# Patient Record
Sex: FEMALE | Race: BLACK OR AFRICAN AMERICAN | Employment: FULL TIME | URBAN - METROPOLITAN AREA
[De-identification: names, ages, dates, MRNs, and addresses within clinical notes are randomized per-mention and may not be internally consistent; named-entity substitution may affect disease eponyms.]

---

## 2017-03-02 ENCOUNTER — TELEPHONE (OUTPATIENT)
Dept: FAMILY MEDICINE | Facility: CLINIC | Age: 22
End: 2017-03-02

## 2017-03-03 NOTE — TELEPHONE ENCOUNTER
Spoke to patient and gave her Dia's message. Patient  States she [place] and has records   that immunizations have been done. She states that she will   Drop this off today. Waiting for records.  Savanna Ortiz MA/  For Teams Malachi

## 2017-03-03 NOTE — TELEPHONE ENCOUNTER
Reviewed the chart. I appears that we have no records of patient's immunizations, she needs MMR and Hep B.  Patient needs to get records form her pediatric clinic or come in for office visit to have titers done. The form that patient supplied requires records of the above immunizations    Dia Schwartz PAC

## 2017-03-03 NOTE — TELEPHONE ENCOUNTER
Received forms. Reviewed chart and patient has not seen Dia  And saw Ani over 1 year ago. I Lynced Heather and pointed this out.  She wants me to see if Dia will do the paperwork for Ani.  Reviewed chart again and her immunization report on shows a Tdap  Done on 2/2/16. I checked [facility] and the same thing was documented.  Patient has not had any of the rest of the requirements, at least from  This clinic, completed. I will place form in Dia's basket for review. Patient  May need to come in to have missing items completed. Rao advise.  Savanna Ortiz MA/  For Teams Malachi

## 2017-03-03 NOTE — TELEPHONE ENCOUNTER
Received completed and signed form. Copy to TC and abstracting.  Bringing original form and updated immunization report to the front   desk by 5:00 pm today. Called and spoke to patient and explained   Form .  Savanna Ortiz MA/  For Teams Malachi

## 2017-03-03 NOTE — TELEPHONE ENCOUNTER
Form will be in the dummy for Savanna to forward to Dia on the 2nd Floor.  Ray Fajardo,  For Teams Comfort and Heart

## 2017-03-03 NOTE — TELEPHONE ENCOUNTER
Pt dropped off copy of immunizations.. Which were placed on the dumbwaiter.     Idalia Hsieh, Patient Rep  [facility]

## 2017-03-03 NOTE — TELEPHONE ENCOUNTER
Received immunizations from [place].  I entered them in as historical. Sent paper  Copy to provider, abstracting and TC and routed  Message to the provider to review.  Savanna Ortiz MA/  For Teams Spirit and Tatiana

## 2017-03-10 ENCOUNTER — OFFICE VISIT (OUTPATIENT)
Dept: FAMILY MEDICINE | Facility: CLINIC | Age: 22
End: 2017-03-10
Payer: COMMERCIAL

## 2017-03-10 VITALS
OXYGEN SATURATION: 98 % | HEIGHT: 63 IN | HEART RATE: 64 BPM | DIASTOLIC BLOOD PRESSURE: 72 MMHG | BODY MASS INDEX: 26.26 KG/M2 | WEIGHT: 148.2 LBS | SYSTOLIC BLOOD PRESSURE: 103 MMHG | TEMPERATURE: 98 F

## 2017-03-10 DIAGNOSIS — G44.219 EPISODIC TENSION-TYPE HEADACHE, NOT INTRACTABLE: Primary | ICD-10-CM

## 2017-03-10 DIAGNOSIS — E66.3 OVERWEIGHT (BMI 25.0-29.9): ICD-10-CM

## 2017-03-10 PROCEDURE — 99213 OFFICE O/P EST LOW 20 MIN: CPT | Performed by: NURSE PRACTITIONER

## 2017-03-10 NOTE — NURSING NOTE
"Chief Complaint   Patient presents with     Headache     Panel Management     Pap     Patient Request for Note/Letter       Initial /72 (BP Location: Left arm, Patient Position: Chair, Cuff Size: Adult Regular)  Pulse 64  Temp 98  F (36.7  C) (Oral)  Ht 5' 2.99\" (1.6 m)  Wt 148 lb 3.2 oz (67.2 kg)  LMP 03/10/2017  SpO2 98%  BMI 26.26 kg/m2 Estimated body mass index is 26.26 kg/(m^2) as calculated from the following:    Height as of this encounter: 5' 2.99\" (1.6 m).    Weight as of this encounter: 148 lb 3.2 oz (67.2 kg).  Medication Reconciliation: complete     Panel Management: Pt will call and schedule physical/pap.  Swati Kruse MA      "

## 2017-03-10 NOTE — PROGRESS NOTES
SUBJECTIVE:                                                    Naseem Pichardo is a 22 year old female who presents to clinic today for the following health issues:      Headache     Onset: 1 week     Description:   Location: unilateral but varies as to which side   Character: sharp, squeezing pain  Frequency:  Every other day   Duration:  The entire day     Intensity: moderate    Progression of Symptoms:  same    Accompanying Signs & Symptoms:  Stiff neck: YES  Neck or upper back pain: YES- upper back   Fever: no  Sinus pressure: no  Nausea or vomiting: no  Dizziness: YES  Numbness: no  Weakness: no  Visual changes: YES- blurred vision    History:   Head trauma: no  Family history of migraines: no  Previous tests for headaches: no  Neurologist evaluations: no  Able to do daily activities: YES- sometimes   Wake with a headaches: YES  Do headaches wake you up: no  Daily pain medication use: no  Work/school stressors/changes: YES- school     Precipitating factors:   Does light make it worse: no  Does sound make it worse: YES    Alleviating factors:  Does sleep help: no         Therapies Tried and outcome: Ibuprofen (Advil, Motrin)- minor relief.  Taking 3-4 days/week, headaches not related to meals, menstrual cycle, activity.  She eats regular meals, drinks plenty of fluids, sleep 4-5 hours/noc.  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      Problem list and histories reviewed & adjusted, as indicated.  Additional history: as documented    Recent Labs   Lab Test  02/04/16   0951   LDL  103*   HDL  53   TRIG  95      BP Readings from Last 3 Encounters:   03/10/17 103/72   02/02/16 100/66    Wt Readings from Last 3 Encounters:   03/10/17 148 lb 3.2 oz (67.2 kg)   02/02/16 146 lb 1 oz (66.3 kg)                  Labs reviewed in EPIC    Reviewed and updated as needed this visit by clinical staff  Tobacco  Allergies  Meds  Med Hx  Surg Hx  Fam Hx  Soc Hx      Reviewed and updated as needed this visit by  "Provider         ROS:  Constitutional, HEENT, cardiovascular, pulmonary, gi and gu systems are negative, except as otherwise noted.    OBJECTIVE:                                                    /72 (BP Location: Left arm, Patient Position: Chair, Cuff Size: Adult Regular)  Pulse 64  Temp 98  F (36.7  C) (Oral)  Ht 5' 2.99\" (1.6 m)  Wt 148 lb 3.2 oz (67.2 kg)  LMP 03/10/2017  SpO2 98%  BMI 26.26 kg/m2  Body mass index is 26.26 kg/(m^2).  GENERAL: healthy, alert and no distress  EYES: Eyes grossly normal to inspection, PERRL and conjunctivae and sclerae normal  HENT: ear canals and TM's normal, nose and mouth without ulcers or lesions  NECK: no adenopathy, no asymmetry, masses, or scars and thyroid normal to palpation  RESP: lungs clear to auscultation - no rales, rhonchi or wheezes  CV: regular rate and rhythm, normal S1 S2, no S3 or S4, no murmur, click or rub, no peripheral edema and peripheral pulses strong  ABDOMEN: soft, nontender, no hepatosplenomegaly, no masses and bowel sounds normal  MS: no gross musculoskeletal defects noted, no edema  SKIN: no suspicious lesions or rashes  NEURO: Normal strength and tone, sensory exam grossly normal, mentation intact, oriented times 3, speech normal, cranial nerves 2-12 intact, DTR's normal and symmetric UE/LE and gait normal including heel/toe/tandem walking  BACK: no CVA tenderness, no paralumbar tenderness  PSYCH: mentation appears normal, affect normal/bright    Diagnostic Test Results:  none      ASSESSMENT/PLAN:                                                          BMI:   Estimated body mass index is 26.26 kg/(m^2) as calculated from the following:    Height as of this encounter: 5' 2.99\" (1.6 m).    Weight as of this encounter: 148 lb 3.2 oz (67.2 kg).   Weight management plan: Discussed healthy diet and exercise guidelines and patient will follow up in 12 months in clinic to re-evaluate.      1. Episodic tension-type headache, not intractable    OK to " use Tylenol or Ibuprofen for episodic headaches but do not use for more than 3 days/week to avoid rebound headache.  Patient was given educational materials on tension headaches.  Patient advised to do ROM exercises 2-3 times per day and apply heat to the neck as needed (patient warned not to sleep with heating pad on), and discussed stress management techniques.  Advised headache log.Follow-up if headache persists despite these treatments, or if symptoms change.       2.  Overweight, BMI 25.0-29.9  Benefits of weight loss reviewed in detail, encouraged her to cut back on the carbohydrates in the diet, consume more fruits and vegetables, drink plenty of water, avoid fruit juices, sodas, get 150 min moderate exercise/week.  Recheck weight in 6 months.          See Patient Instructions    VY Mensah [facility]

## 2017-03-10 NOTE — PATIENT INSTRUCTIONS
Based on your medical history and these are the current health maintenance or preventive care services that you are due for (some may have been done at this visit)  Health Maintenance Due   Topic Date Due     HPV IMMUNIZATION (1 of 3 - Female 3 Dose Series) 01/05/2006     PAP SCREENING Q3 YR (SYSTEM ASSIGNED)  01/05/2016     EYE EXAM Q1 YEAR( NO INBASKET)  02/10/2017         At [facility], we strive to deliver an exceptional experience to you, every time we see you.    If you receive a survey in the mail, please send us back your thoughts. We really do value your feedback.    Your care team's suggested websites for health information:  Www.PipelineDB.org : Up to date and easily searchable information on multiple topics.  Www.medlineplus.gov : medication info, interactive tutorials, watch real surgeries online  Www.familydoctor.org : good info from the Academy of Family Physicians  Www.cdc.gov : public health info, travel advisories, epidemics (H1N1)  Www.aap.org : children's health info, normal development, vaccinations  Www.health.[facility].[place].us : [place] dept of health, public health issues in [place], N1N1    How to contact your care team:   Team Tatiana/Spirit [phone]         Pharmacy [phone]    Dr. Blanca, Dia Schwartz PA-C, Dr. Torres, Camille Stahl APRN CNP, Fanny Cortez PA-C, Dr. Singer, and VY Blank CNP    Team RNs: Drea & Lisa      Clinic hours  M-Th 7 am-7 pm   Fri 7 am-5 pm.   Urgent care M-F 11 am-9 pm,   Sat/Sun 9 am-5 pm.  Pharmacy M-Th 8 am-8 pm Fri 8 am-6 pm  Sat/Sun 9 am-5 pm.     All password changes, disabled accounts, or ID changes in "[employer]t/MyHealth will be done by our Access Services Department.    If you need help with your account or password, call: [phone]. Clinic staff no longer has the ability to change passwords.     Tension Headaches  Tension headaches cause a dull, steady pain on both sides of the head and in the neck and the back  of the head. The eyes may also feel tired. Tension headaches can be triggered by muscle tension and clenching, lack of sleep, poor posture, eyestrain, stress,depression, anxiety, arthritis of the neck, and other factors.    To help prevent tension headaches  Take the following steps:    Make sure your work area is properly set up to help you avoid neck strain and eyestrain.    Make sure that your eyeglass prescription is current and is appropriate for the work you do.    Learn techniques for relaxing and reducing emotional stress. These include deep breathing, progressive relaxation, yoga, meditation, and biofeedback.    Maintain a regular exercise regimen under the guidance of a doctor. This can help keep your neck and back flexible, strong, and relaxed.  To relieve the pain  Take the following steps:    Use moist heat to relax the muscles. Soak in a hot bath or wrap a warm, moist towel around your neck.    Brush your scalp lightly with a soft hairbrush.    Give yourself a massage. Knead the muscles running from your shoulders up the back of your skull.    Use an ice pack. Apply this directly to the place where you feel pain.    Rest. Sleeping often helps relieve headache pain.    Drink plenty of fluids. Dehydration is another trigger for headaches.    Use pain medicine sparingly for moderate to severe pain.     9236-5020 The [employer]. [address] [place] [address], [place] [address]. All rights reserved. This information is not intended as a substitute for professional medical care. Always follow your healthcare professional's instructions.

## 2017-03-10 NOTE — MR AVS SNAPSHOT
After Visit Summary   3/10/2017    Naseem Pichardo    MRN: [id number]           Patient Information     Date Of Birth          1995        Visit Information        Provider Department      3/10/2017 8:00 AM Virginia Mosley APRN CNP [facility]        Today's Diagnoses     Episodic tension-type headache, not intractable    -  1    Overweight (BMI 25.0-29.9)          Care Instructions    Based on your medical history and these are the current health maintenance or preventive care services that you are due for (some may have been done at this visit)  Health Maintenance Due   Topic Date Due     HPV IMMUNIZATION (1 of 3 - Female 3 Dose Series) 01/05/2006     PAP SCREENING Q3 YR (SYSTEM ASSIGNED)  01/05/2016     EYE EXAM Q1 YEAR( NO INBASKET)  02/10/2017         At [facility], we strive to deliver an exceptional experience to you, every time we see you.    If you receive a survey in the mail, please send us back your thoughts. We really do value your feedback.    Your care team's suggested websites for health information:  Www.[employer] : Up to date and easily searchable information on multiple topics.  Www.medlineplus.gov : medication info, interactive tutorials, watch real surgeries online  Www.familydoctor.org : good info from the Academy of Family Physicians  Www.cdc.gov : public health info, travel advisories, epidemics (H1N1)  Www.aap.org : children's health info, normal development, vaccinations  Www.health.[facility].us : [place] dept of health, public health issues in [place], N1N1    How to contact your care team:   Team Tatiana/Spirit [phone]         Pharmacy [phone]    Dr. Blanca, Dia Schwartz PA-C, Dr. Torres, Camille MCCLELLAN CNP, Fanny Cortez PA-C, Dr. Singer, and VY Blank CNP    Team RNs: Drea & Lisa      Clinic hours  M-Th 7 am-7 pm   Fri 7 am-5 pm.   Urgent care M-F 11 am-9 pm,   Sat/Sun 9 am-5 pm.  Pharmacy M-Th 8  am-8 pm Fri 8 am-6 pm  Sat/Sun 9 am-5 pm.     All password changes, disabled accounts, or ID changes in [employer]/MyHealth will be done by our Access Services Department.    If you need help with your account or password, call: [phone]. Clinic staff no longer has the ability to change passwords.     Tension Headaches  Tension headaches cause a dull, steady pain on both sides of the head and in the neck and the back of the head. The eyes may also feel tired. Tension headaches can be triggered by muscle tension and clenching, lack of sleep, poor posture, eyestrain, stress,depression, anxiety, arthritis of the neck, and other factors.    To help prevent tension headaches  Take the following steps:    Make sure your work area is properly set up to help you avoid neck strain and eyestrain.    Make sure that your eyeglass prescription is current and is appropriate for the work you do.    Learn techniques for relaxing and reducing emotional stress. These include deep breathing, progressive relaxation, yoga, meditation, and biofeedback.    Maintain a regular exercise regimen under the guidance of a doctor. This can help keep your neck and back flexible, strong, and relaxed.  To relieve the pain  Take the following steps:    Use moist heat to relax the muscles. Soak in a hot bath or wrap a warm, moist towel around your neck.    Brush your scalp lightly with a soft hairbrush.    Give yourself a massage. Knead the muscles running from your shoulders up the back of your skull.    Use an ice pack. Apply this directly to the place where you feel pain.    Rest. Sleeping often helps relieve headache pain.    Drink plenty of fluids. Dehydration is another trigger for headaches.    Use pain medicine sparingly for moderate to severe pain.     5498-1238 The [employer]. [address] [place] [address], [place] [address]. All rights reserved. This information is not intended as a substitute for professional medical care. Always  "follow your healthcare professional's instructions.              Follow-ups after your visit        Follow-up notes from your care team     Return in about 4 weeks (around 2017), or if symptoms worsen or fail to improve, for Physical Exam, follow up.      Who to contact     If you have questions or need follow up information about today's clinic visit or your schedule please contact [facility] directly at [phone].  Normal or non-critical lab and imaging results will be communicated to you by [employer], letter or phone within 4 business days after the clinic has received the results. If you do not hear from us within 7 days, please contact the clinic through [employer] or phone. If you have a critical or abnormal lab result, we will notify you by phone as soon as possible.  Submit refill requests through [employer] or call your pharmacy and they will forward the refill request to us. Please allow 3 business days for your refill to be completed.          Additional Information About Your Visit        [employer] Information     [employer] lets you send messages to your doctor, view your test results, renew your prescriptions, schedule appointments and more. To sign up, go to [email or web address] . Click on \"Log in\" on the left side of the screen, which will take you to the Welcome page. Then click on \"Sign up Now\" on the right side of the page.     You will be asked to enter the access code listed below, as well as some personal information. Please follow the directions to create your username and password.     Your access code is: [email or web address]  Expires: 2017  8:54 AM     Your access code will  in 90 days. If you need help or a new code, please call your [place] clinic or [phone].        Care EveryWhere ID     This is your Care EveryWhere ID. This could be used by other organizations to access your [place] [facility] medical records  [id number]        Your Vitals Were     Pulse Temperature " "Height Last Period Pulse Oximetry BMI (Body Mass Index)    64 98  F (36.7  C) (Oral) 5' 2.99\" (1.6 m) 03/10/2017 98% 26.26 kg/m2       Blood Pressure from Last 3 Encounters:   03/10/17 103/72   02/02/16 100/66    Weight from Last 3 Encounters:   03/10/17 148 lb 3.2 oz (67.2 kg)   02/02/16 146 lb 1 oz (66.3 kg)              Today, you had the following     No orders found for display       Primary Care Provider Office Phone #    [facility] [phone]       No address on file        Thank you!     Thank you for choosing [facility]  for your care. Our goal is always to provide you with excellent care. Hearing back from our patients is one way we can continue to improve our services. Please take a few minutes to complete the written survey that you may receive in the mail after your visit with us. Thank you!             Your Updated Medication List - Protect others around you: Learn how to safely use, store and throw away your medicines at www.disposemymeds.org.      Notice  As of 3/10/2017  8:54 AM    You have not been prescribed any medications.      "

## 2017-03-10 NOTE — LETTER
[address] [address]  [phone]    March 10, 2017        Lenssa Kylee  [address]   [facility] [address]          To whom it may concern:    This patient missed school 3/10/2017 due to a clinic visit.  She can return to school on 3/13/17.    Please contact me for questions or concerns.        Sincerely,        Virginia MCCLELLAN, CNP

## 2019-08-07 ENCOUNTER — NURSE TRIAGE (OUTPATIENT)
Dept: NURSING | Facility: CLINIC | Age: 24
End: 2019-08-07

## 2019-08-07 ENCOUNTER — TELEPHONE (OUTPATIENT)
Dept: FAMILY MEDICINE | Facility: CLINIC | Age: 24
End: 2019-08-07

## 2019-08-07 ENCOUNTER — OFFICE VISIT (OUTPATIENT)
Dept: FAMILY MEDICINE | Facility: CLINIC | Age: 24
End: 2019-08-07
Payer: COMMERCIAL

## 2019-08-07 VITALS
WEIGHT: 164.13 LBS | HEART RATE: 65 BPM | TEMPERATURE: 98.9 F | SYSTOLIC BLOOD PRESSURE: 112 MMHG | OXYGEN SATURATION: 99 % | DIASTOLIC BLOOD PRESSURE: 71 MMHG | BODY MASS INDEX: 29.08 KG/M2 | HEIGHT: 63 IN

## 2019-08-07 DIAGNOSIS — B96.89 BV (BACTERIAL VAGINOSIS): ICD-10-CM

## 2019-08-07 DIAGNOSIS — H61.21 IMPACTED CERUMEN OF RIGHT EAR: ICD-10-CM

## 2019-08-07 DIAGNOSIS — Z12.4 SCREENING FOR MALIGNANT NEOPLASM OF CERVIX: ICD-10-CM

## 2019-08-07 DIAGNOSIS — N76.0 BV (BACTERIAL VAGINOSIS): ICD-10-CM

## 2019-08-07 DIAGNOSIS — E66.3 OVERWEIGHT (BMI 25.0-29.9): ICD-10-CM

## 2019-08-07 DIAGNOSIS — Z11.3 SCREEN FOR STD (SEXUALLY TRANSMITTED DISEASE): ICD-10-CM

## 2019-08-07 DIAGNOSIS — F43.21 ADJUSTMENT DISORDER WITH DEPRESSED MOOD: ICD-10-CM

## 2019-08-07 DIAGNOSIS — Z11.4 ENCOUNTER FOR SCREENING FOR HIV: ICD-10-CM

## 2019-08-07 DIAGNOSIS — Z00.00 ROUTINE GENERAL MEDICAL EXAMINATION AT A HEALTH CARE FACILITY: Primary | ICD-10-CM

## 2019-08-07 LAB
SPECIMEN SOURCE: ABNORMAL
WET PREP SPEC: ABNORMAL

## 2019-08-07 PROCEDURE — 87591 N.GONORRHOEAE DNA AMP PROB: CPT | Performed by: NURSE PRACTITIONER

## 2019-08-07 PROCEDURE — 87491 CHLMYD TRACH DNA AMP PROBE: CPT | Performed by: NURSE PRACTITIONER

## 2019-08-07 PROCEDURE — G0145 SCR C/V CYTO,THINLAYER,RESCR: HCPCS | Performed by: NURSE PRACTITIONER

## 2019-08-07 PROCEDURE — 99395 PREV VISIT EST AGE 18-39: CPT | Performed by: NURSE PRACTITIONER

## 2019-08-07 PROCEDURE — 99213 OFFICE O/P EST LOW 20 MIN: CPT | Mod: 25 | Performed by: NURSE PRACTITIONER

## 2019-08-07 PROCEDURE — 87210 SMEAR WET MOUNT SALINE/INK: CPT | Performed by: NURSE PRACTITIONER

## 2019-08-07 ASSESSMENT — ANXIETY QUESTIONNAIRES
2. NOT BEING ABLE TO STOP OR CONTROL WORRYING: MORE THAN HALF THE DAYS
1. FEELING NERVOUS, ANXIOUS, OR ON EDGE: NOT AT ALL
7. FEELING AFRAID AS IF SOMETHING AWFUL MIGHT HAPPEN: NEARLY EVERY DAY
6. BECOMING EASILY ANNOYED OR IRRITABLE: MORE THAN HALF THE DAYS
3. WORRYING TOO MUCH ABOUT DIFFERENT THINGS: MORE THAN HALF THE DAYS
5. BEING SO RESTLESS THAT IT IS HARD TO SIT STILL: SEVERAL DAYS
GAD7 TOTAL SCORE: 11
IF YOU CHECKED OFF ANY PROBLEMS ON THIS QUESTIONNAIRE, HOW DIFFICULT HAVE THESE PROBLEMS MADE IT FOR YOU TO DO YOUR WORK, TAKE CARE OF THINGS AT HOME, OR GET ALONG WITH OTHER PEOPLE: NOT DIFFICULT AT ALL

## 2019-08-07 ASSESSMENT — PAIN SCALES - GENERAL: PAINLEVEL: NO PAIN (0)

## 2019-08-07 ASSESSMENT — PATIENT HEALTH QUESTIONNAIRE - PHQ9
5. POOR APPETITE OR OVEREATING: SEVERAL DAYS
SUM OF ALL RESPONSES TO PHQ QUESTIONS 1-9: 21

## 2019-08-07 ASSESSMENT — MIFFLIN-ST. JEOR: SCORE: 1455.66

## 2019-08-07 NOTE — PROGRESS NOTES
hong     SUBJECTIVE:   CC: Naseem Pichardo is an 24 year old woman who presents for preventive health visit.     Healthy Habits:    Do you get at least three servings of calcium containing foods daily (dairy, green leafy vegetables, etc.)? yes    Amount of exercise or daily activities, outside of work: none  day(s) per week    Problems taking medications regularly not applicable    Medication side effects: No    Have you had an eye exam in the past two years? no    Do you see a dentist twice per year? yes    Do you have sleep apnea, excessive snoring or daytime drowsiness?yes daytime drowsiness       Patient also states her Father  19 in his late 50's-early 5-0's,cause unknown.  patients mood has worsened since- she is anxious and depressed, PHQ-9=21, GABY-7=11, passive thoughts of suicide but no plan or intent.  She is living with her Aunt currently. She states she's had issues with depression off an on for several years and thought about seeing a counselor but never got around to it. She has never been on medication for her mood issue.    Today's PHQ-2 Score:   PHQ-2 (  [employer]) 2016   Q1: Little interest or pleasure in doing things 0   Q2: Feeling down, depressed or hopeless 0   PHQ-2 Score 0       Abuse: Current or Past(Physical, Sexual or Emotional)- No  Do you feel safe in your environment? Yes    Social History     Tobacco Use     Smoking status: Never Smoker     Smokeless tobacco: Never Used   Substance Use Topics     Alcohol use: No     Alcohol/week: 0.0 oz     If you drink alcohol do you typically have >3 drinks per day or >7 drinks per week? No                     Reviewed orders with patient.  Reviewed health maintenance and updated orders accordingly - Yes  Labs reviewed in EPIC  BP Readings from Last 3 Encounters:   19 112/71   03/10/17 103/72   16 100/66    Wt Readings from Last 3 Encounters:   19 74.4 kg (164 lb 2 oz)   03/10/17 67.2 kg (148 lb 3.2 oz)   16 66.3 kg  (146 lb 1 oz)                  There is no problem list on file for this patient.    Past Surgical History:   Procedure Laterality Date     NO HISTORY OF SURGERY         Social History     Tobacco Use     Smoking status: Never Smoker     Smokeless tobacco: Never Used   Substance Use Topics     Alcohol use: No     Alcohol/week: 0.0 oz     Family History   Problem Relation Age of Onset     No Known Problems Mother      Hypertension Father      Diabetes No family hx of      Coronary Artery Disease No family hx of      Hyperlipidemia No family hx of      Cerebrovascular Disease No family hx of      Breast Cancer No family hx of      Colon Cancer No family hx of      Cancer No family hx of      Thyroid Disease No family hx of      Macular Degeneration No family hx of      Glaucoma No family hx of            Mammogram not appropriate for this patient based on age.    Pertinent mammograms are reviewed under the imaging tab.  History of abnormal Pap smear: NO - age 21-29 PAP every 3 years recommended     Reviewed and updated as needed this visit by clinical staff  Tobacco  Allergies  Soc Hx        Reviewed and updated as needed this visit by Provider        Past Medical History:   Diagnosis Date     NO ACTIVE PROBLEMS       Past Surgical History:   Procedure Laterality Date     NO HISTORY OF SURGERY         ROS:  CONSTITUTIONAL: NEGATIVE for fever, chills, change in weight  INTEGUMENTARU/SKIN: NEGATIVE for worrisome rashes, moles or lesions  EYES: NEGATIVE for vision changes or irritation  ENT: NEGATIVE for ear, mouth and throat problems  RESP: NEGATIVE for significant cough or SOB  BREAST: NEGATIVE for masses, tenderness or discharge  CV: NEGATIVE for chest pain, palpitations or peripheral edema  GI: NEGATIVE for nausea, abdominal pain, heartburn, or change in bowel habits  : NEGATIVE for unusual urinary or vaginal symptoms. Periods are regular.  MUSCULOSKELETAL: NEGATIVE for significant arthralgias or myalgia  NEURO:  "NEGATIVE for weakness, dizziness or paresthesias  ENDOCRINE: NEGATIVE for temperature intolerance, skin/hair changes  PSYCHIATRIC: POSITIVE for anxiety, depressed moodappetite disturbance -decreased, concentration difficulty, depressed mood, fatigue and hypersomnia and NEGATIVE foralcohol abuse, delusions, feelings of worthlessness/guilt and hopelessness    OBJECTIVE:   /71   Pulse 65   Temp 98.9  F (37.2  C) (Oral)   Ht 1.588 m (5' 2.5\")   Wt 74.4 kg (164 lb 2 oz)   SpO2 99%   BMI 29.54 kg/m    EXAM:  GENERAL: healthy, alert and no distress  EYES: Eyes grossly normal to inspection, PERRL and conjunctivae and sclerae normal  HENT: ear canals and TM's normal, nose and mouth without ulcers or lesions  NECK: no adenopathy, no asymmetry, masses, or scars and thyroid normal to palpation  RESP: lungs clear to auscultation - no rales, rhonchi or wheezes  BREAST: normal without masses, tenderness or nipple discharge and no palpable axillary masses or adenopathy  CV: regular rate and rhythm, normal S1 S2, no S3 or S4, no murmur, click or rub, no peripheral edema and peripheral pulses strong  ABDOMEN: soft, nontender, no hepatosplenomegaly, no masses and bowel sounds normal   (female): normal female external genitalia, normal urethral meatus, vaginal mucosa pink, moist, well rugated, and normal cervix/adnexa/uterus without masses, creamy white discharge present, wet prep, GC, pap obtained  MS: no gross musculoskeletal defects noted, no edema  SKIN: no suspicious lesions or rashes  NEURO: Normal strength and tone, mentation intact and speech normal  PSYCH: mentation appears normal, affect normal/bright  LYMPH: no cervical, supraclavicular, axillary, or inguinal adenopathy    Diagnostic Test Results:  Labs reviewed in Epic  No results found for this or any previous visit (from the past 24 hour(s)).    ASSESSMENT/PLAN:   1. Routine general medical examination at a health care facility    - Glucose; Future    2. " Screening for malignant neoplasm of cervix    - Pap imaged thin layer screen only - recommended age 21 - 24 years    3. Adjustment disorder with depressed mood  Starting Zoloft.  I've explained to her that drugs of the SSRI class can have side effects such as weight gain, sexual dysfunction, insomnia, headache, nausea. These medications are generally effective at alleviating symptoms of anxiety and/or depression. Let me know if significant side effects do occur. Return to clinic 3 weeks for medication check. Referring for counseling as well.    - sertraline (ZOLOFT) 50 MG tablet; Take 1 tablet (50 mg) by mouth daily  Dispense: 90 tablet; Refill: 1  - MENTAL HEALTH REFERRAL  - Adult; Outpatient Treatment; Individual/Couples/Family/Group Therapy/Health Psychology; [facility]: [facility] [phone]; We will contact you to schedule the appointment or please call with any questions    4. Screen for STD (sexually transmitted disease)  Safer sex pracrtices reviewed.  - NEISSERIA GONORRHOEA PCR  - CHLAMYDIA TRACHOMATIS PCR  - Hepatic panel; Future  - HIV Antigen Antibody Combo; Future  - UA with Microscopic reflex to Culture  - Wet prep  - Hepatitis B Surface Antibody; Future  - Hepatitis B core antibody; Future  - Hepatitis B surface antigen; Future    5. Encounter for screening for HIV    - HIV Antigen Antibody Combo; Future    6. Impacted cerumen of right ear  Right canal is clear and TM is pearly grey with normal landmarks after ear flush.    7.  Overweight (BMI 25.0-29.9)  Benefits of weight loss reviewed in detail, encouraged her to cut back on the carbohydrates in the diet, consume more fruits and vegetables, drink plenty of water, avoid fruit juices, sodas, get 150 min moderate exercise/week.  Recheck weight in 6 months.    9.  BV (bacterial vaginosis)     Treating with Flagyl. Patient is to avoid alcohol while on Flagyl.      Approximately 40 minutes were spent face-to-face with patient and greater  "than 50% of that time was spent on counseling and coordination of care.     COUNSELING:   Reviewed preventive health counseling, as reflected in patient instructions    Estimated body mass index is 29.54 kg/m  as calculated from the following:    Height as of this encounter: 1.588 m (5' 2.5\").    Weight as of this encounter: 74.4 kg (164 lb 2 oz).    Weight management plan: Discussed healthy diet and exercise guidelines     reports that she has never smoked. She has never used smokeless tobacco.      Counseling Resources:  ATP IV Guidelines  Pooled Cohorts Equation Calculator  Breast Cancer Risk Calculator  FRAX Risk Assessment  ICSI Preventive Guidelines  Dietary Guidelines for Americans, 2010  USDA's MyPlate  ASA Prophylaxis  Lung CA Screening    VY Mensah [facility]  "

## 2019-08-07 NOTE — LETTER
[facility]  [address] [address]  [phone]        September 19, 2019    Naseem Pichardo  [address]   [place] [address]              Dear Naseem Pichardo    This is to remind you that your Hepatitis  is due.    You may call our office at [phone] to schedule an appointment.    Please disregard this notice if you have already had your labs drawn or made an appointment.        Sincerely,        Virginia Mosley PA-C

## 2019-08-07 NOTE — LETTER
[facility]  [address] [address]  [phone]        February 27, 2020    Naseem Pichardo  [address]   [place] [address]              Dear Naseem Pichardo    This is to remind you that your fasting lab is due.    You may call our office at [phone] to schedule an appointment.    Please disregard this notice if you have already had your labs drawn or made an appointment.        Sincerely,        Virginia Mosley

## 2019-08-07 NOTE — LETTER
August 13, 2019    [facility]  [address]   [place] [address]      Dear ,      This letter is in regards to your recent cervical cancer screening (Pap smear and HPV test).    Your Pap smear result was reported as ASCUS or Atypical Squamous Cells of Undetermined Significance. This means that there were mildly abnormal cells found in the sample that we collected from your cervix. The vast majority of patients with this result do not have significant cervical abnormalities.     Your cervical sample was also tested for the presence of Human Papillomavirus (HPV). Your HPV test is NEGATIVE for high risk HPV, meaning that no HPV was found at this time.     Over time, your body can get rid of these abnormal cells, so it is recommended that you repeat your Pap smear and HPV test in 3 years.    If you have additional questions regarding this result, please call our registered nurse, Ariella at [phone].    Please continue to be seen every year for an annual physical exam and other preventative tests.         Sincerely,    VY Mensah CNP/rlm

## 2019-08-07 NOTE — LETTER
[facility]  [address] [address]  [phone]        December 26, 2019    Naseem Pichardo  [address]   [place] [address]              Dear Naseem Pichardo    This is to remind you that your Liver Panel is due.    You may call our office at [phone] to schedule an appointment.    Please disregard this notice if you have already had your labs drawn or made an appointment.        Sincerely,        Virginia Mosley PA-C

## 2019-08-07 NOTE — TELEPHONE ENCOUNTER
Notes recorded by Virginia Mosley APRN CNP on 8/7/2019 at 5:50 PM CDT  Pls call patient with the message below.      HI Lenssa,    Your wet prep showed a bacterial infection so I have written for some flagyl and ask that you take one tablet twice daily for 7 days. Do not drink alcohol while taking this medication and let me know if you continue to be symptomatic.    Feel free to contact me with any questions or concerns.  Thank you for allowing me to participate in your care.        Virginia MCCLELLAN, CNP    This writer attempted to contact Naseem on 08/07/19      Reason for call results and left message.      If patient calls back:   Registered Nurse called. Follow Triage Call workflow        ORTIZ ObrienN, RN, PHN

## 2019-08-07 NOTE — PATIENT INSTRUCTIONS
Preventive Health Recommendations  Female Ages 21 to 25     Yearly exam:     See your health care provider every year in order to  o Review health changes.   o Discuss preventive care.    o Review your medicines if your doctor has prescribed any.      You should be tested each year for STDs (sexually transmitted diseases).       Talk to your provider about how often you should have cholesterol testing.      Get a Pap test every three years. If you have an abnormal result, your doctor may have you test more often.      If you are at risk for diabetes, you should have a diabetes test (fasting glucose).     Shots:     Get a flu shot each year.     Get a tetanus shot every 10 years.     Consider getting the shot (vaccine) that prevents cervical cancer (Gardasil).    Nutrition:     Eat at least 5 servings of fruits and vegetables each day.    Eat whole-grain bread, whole-wheat pasta and brown rice instead of white grains and rice.    Get adequate Calcium and Vitamin D.     Lifestyle    Exercise at least 150 minutes a week each week (30 minutes a day, 5 days a week). This will help you control your weight and prevent disease.    Limit alcohol to one drink per day.    No smoking.     Wear sunscreen to prevent skin cancer.    See your dentist every six months for an exam and cleaning.  Patient Education     Understanding STDs    When it comes to sex, nothing is risk-free. Any sexual contact with the penis, vagina, anus, or mouth can spread a sexually transmitted disease (STD). The only sure way to prevent STDs is abstinence (not having sex). But there are ways to make sex safer. Use a latex condom each time you have sex. And choose your partner wisely.  Use condoms for safer sex  If you have sex, latex condoms provide the best protection against STDs. Latex condoms stop the exchange of body fluids that carry certain STDs. They also limit contact with affected skin. Be aware though, a condom doesn t cover all skin. So,  affected skin that is not covered can still transfer disease. But you re safer with a condom than without one. Use a condom even if you use other birth control. While birth control methods like the pill or IUD help prevent pregnancy, they do not protect against STDs.  Choose the right condom  Condoms made of latex prevent disease best. If you re allergic to latex, use polyurethane condoms instead. Male condoms fit over the penis. Female condoms line the vagina. Before buying a condom, read the label to be sure it prevents disease. Some novelty condoms don t.  The right lubricant helps  Buy lubricated condoms or use lubricant. This provides greater comfort and reduces the risk of condom breakage. Use only water-based lubricants. Don t use oil, lotion, or petroleum jelly. They can weaken the condom, causing breakage. Also, you may want to choose lubricants without nonoxynol-9. It s now known that this spermicide does not prevent disease and may cause irritation.  Use condoms correctly  For condoms to work, they must be used the right way. Keep these tips in mind:  Use a new latex condom each time you have sex. Slip the condom on the penis before any contact is made.  When ready to withdraw, hold the rim of the condom as the penis pulls out. This prevents the condom from slipping off.  Check the expiration date before using a condom.  Don t store condoms in places that can get hot, such as a car or a wallet that is carried in a back pocket.  Get to know your partner  Safer sex is a process. It involves getting to know your partner and making informed choices. Ask each other how many partners you have had in the past, and how many you have now. Find out if either of you has an STD. If you decide to have sex, use a condom each time. Don t stop using condoms unless you re sure neither of you has other partners and you ve both been tested to confirm you don t have STDs. Then stay free of disease by having sex only with  each other (monogamy).  Keep your cool  Don t let alcohol or drugs cloud your judgment. They could lead you to have sex with someone you wouldn t have chosen if you were sober. Or, you might forget to use a condom. If you do plan to have sex, keep a latex condom with you. Don t wait until you re in the heat of passion to try to find one.  Consider abstinence  The only way to be sure you won t get an STD is to abstain from sex. Abstinence is a choice that many people make at some point in their lives. Maybe you want to wait until you are sure you re ready before you have sex. Maybe you d like a break from the responsibilities of sex for a while. Or maybe you just want to know your partner better before taking the next step. Abstinence is a choice you can make now to protect your future.  Date Last Reviewed: 12/1/2016 2000-2018 [employer]. [address] [place] [address], [place] [address]. All rights reserved. This information is not intended as a substitute for professional medical care. Always follow your healthcare professional's instructions.

## 2019-08-08 RX ORDER — METRONIDAZOLE 500 MG/1
500 TABLET ORAL 2 TIMES DAILY
Qty: 14 TABLET | Refills: 0 | Status: SHIPPED | OUTPATIENT
Start: 2019-08-08 | End: 2019-08-15

## 2019-08-08 ASSESSMENT — ANXIETY QUESTIONNAIRES: GAD7 TOTAL SCORE: 11

## 2019-08-08 NOTE — TELEPHONE ENCOUNTER
Patient returning call regarding her wet prep results.  She has an infection.  Provider intended to prescribe Flagyl BID for seven days, but there is not script in chart or at pharmacy.     Paged on call Dr. Reji wright MD for prescription for Flagyl   500 mg by mouth twice a day for seven days.  Dispense 14 with no refills.    Veronica Marshall RN  [facility]

## 2019-08-08 NOTE — TELEPHONE ENCOUNTER
Patient returning call regarding her wet prep results.  She has an infection.  Provider intended to prescribe Flagyl BID for seven days, but there is not script in chart or at pharmacy.     Paged on call Dr. Reji wright MD for prescription for Flagyl   500 mg by mouth twice a day for seven days.  Dispense 14 with no refills.    Veronica Marshall RN  [facility]        Reason for Disposition    [1] Prescription not at pharmacy AND [2] was prescribed today by PCP    Protocols used: MEDICATION QUESTION CALL-A-AH

## 2019-08-08 NOTE — TELEPHONE ENCOUNTER
Provider, it does appear that on-call MD was not able to prescribe Flagyl. Please advise on filling Rx - pharmacy is pended.     Kasia Tao, BSN, RN, PHN

## 2019-08-09 ENCOUNTER — TELEPHONE (OUTPATIENT)
Dept: FAMILY MEDICINE | Facility: CLINIC | Age: 24
End: 2019-08-09

## 2019-08-09 LAB
C TRACH DNA SPEC QL NAA+PROBE: NEGATIVE
N GONORRHOEA DNA SPEC QL NAA+PROBE: NEGATIVE
SPECIMEN SOURCE: NORMAL
SPECIMEN SOURCE: NORMAL

## 2019-08-09 NOTE — TELEPHONE ENCOUNTER
This writer attempted to contact Patient on 08/09/19      Reason for call lab results and left message.      If patient calls back:   2nd floor [place] Care Team (MA/TC) called. Inform patient that someone from the team will contact them, document that pt called and route to care team.         Meagan Melissa MA

## 2019-08-09 NOTE — TELEPHONE ENCOUNTER
Patient informed of test results.  She is coming in on Monday 8/12/19 for lab tests.      Meagan Melissa, CMA

## 2019-08-09 NOTE — TELEPHONE ENCOUNTER
Patient  returned call    Best number to reach caller: Home number on file [phone] (home)    Is it ok to leave a detailed message: YES

## 2019-08-12 LAB
COPATH REPORT: NORMAL
PAP: NORMAL

## 2019-12-27 ENCOUNTER — OFFICE VISIT (OUTPATIENT)
Dept: URGENT CARE | Facility: URGENT CARE | Age: 24
End: 2019-12-27
Payer: COMMERCIAL

## 2019-12-27 VITALS
WEIGHT: 167.6 LBS | DIASTOLIC BLOOD PRESSURE: 86 MMHG | TEMPERATURE: 98.6 F | SYSTOLIC BLOOD PRESSURE: 132 MMHG | HEART RATE: 83 BPM | BODY MASS INDEX: 30.17 KG/M2 | OXYGEN SATURATION: 100 %

## 2019-12-27 DIAGNOSIS — N92.6 MISSED PERIOD: Primary | ICD-10-CM

## 2019-12-27 LAB — HCG UR QL: NEGATIVE

## 2019-12-27 PROCEDURE — 99213 OFFICE O/P EST LOW 20 MIN: CPT | Performed by: PEDIATRICS

## 2019-12-27 PROCEDURE — 81025 URINE PREGNANCY TEST: CPT | Performed by: PEDIATRICS

## 2019-12-28 NOTE — PROGRESS NOTES
Subjective     Naseem Pichardo is a 24 year old female who presents to clinic today for the following health issues:    HPI     Patient is sexually active and missed her period this month.  LMP was 11/11/19.  She has been experiencing fatigue and low energy.  A home pregnancy test last week was negative.  She has had sex since her last period.    There is no problem list on file for this patient.    Past Surgical History:   Procedure Laterality Date     NO HISTORY OF SURGERY         Social History     Tobacco Use     Smoking status: Current Every Day Smoker     Types: Vaping Device     Smokeless tobacco: Never Used     Tobacco comment: Juul   Substance Use Topics     Alcohol use: No     Alcohol/week: 0.0 standard drinks     Family History   Problem Relation Age of Onset     No Known Problems Mother      Hypertension Father      Diabetes No family hx of      Coronary Artery Disease No family hx of      Hyperlipidemia No family hx of      Cerebrovascular Disease No family hx of      Breast Cancer No family hx of      Colon Cancer No family hx of      Cancer No family hx of      Thyroid Disease No family hx of      Macular Degeneration No family hx of      Glaucoma No family hx of          Current Outpatient Medications   Medication Sig Dispense Refill     sertraline (ZOLOFT) 50 MG tablet Take 1 tablet (50 mg) by mouth daily (Patient not taking: Reported on 12/27/2019) 90 tablet 1         Reviewed and updated as needed this visit by Provider  Tobacco  Soc Hx        Review of Systems         Objective    /86   Pulse 83   Temp 98.6  F (37  C) (Oral)   Wt 76 kg (167 lb 9.6 oz)   LMP 11/11/2019   SpO2 100%   BMI 30.17 kg/m    Body mass index is 30.17 kg/m .  Physical Exam       Diagnostic Test Results:  Results for orders placed or performed in visit on 12/27/19 (from the past 24 hour(s))   HCG Qual, Urine ([id number])   Result Value Ref Range    HCG Qual Urine Negative NEG^Negative           Assessment & Plan     1.  Missed period  Recommend follow-up with OBGYN  - HCG Qual, Urine ([id number])  - OB/GYN REFERRAL     Tobacco Cessation:   reports that she has been smoking vaping device. She has never used smokeless tobacco.              Return in about 1 week (around 1/3/2020) for if not improving.    Naina Singer MD  [facility]

## 2019-12-28 NOTE — PATIENT INSTRUCTIONS
Patient Education     Dysfunctional Uterine Bleeding    Dysfunctional uterine bleeding, also called abnormal uterine bleeding, is a condition in which bleeding is abnormal and occurs at unexpected times of the month. This happens because of changes in the hormones that help control a woman s menstrual cycle each month.  The bleeding may be heavier or lighter than normal. If you have heavy bleeding often, this can lead to a problem called anemia. With anemia, your red blood cell count is too low. Red blood cells help carry oxygen throughout your body. Severe anemia may cause you to look pale and feel very weak or tired. You might also become short of breath easily.  To treat dysfunctional uterine bleeding, medicines are often tried first. If these don t help, or if you have additional symptoms or have reached menopause, further testing and treatments may be needed. Discuss all of your options with your provider.  Home care  Medicines  If you re prescribed medicines, be sure to take them as directed. Some of the more common medicines you may be prescribed include:    Hormone therapy (Options include most methods of hormonal birth control such as pills, shots, or a hormone-releasing IUD)    Nonsteroidal anti-inflammatory drugs (NSAIDs), such as ibuprofen    Iron supplements, if you have anemia     General care    Get plenty of rest if you tire easily. Avoid heavy exertion.    To help relieve pain or cramping that may occur with bleeding, try using a heating pad on the lower belly or back. A warm bath may also help.  Follow-up care  Follow up with your healthcare provider, or as directed.  When to seek medical advice  Call your healthcare provider right away if:    Bleeding becomes heavy (soaking 1 pad or tampon every hour for 3 hours)    Increased abdominal pain    Irregular bleeding worsens or does not get better even with treatment    Fever of 100.4 F (38 C) or higher, or as directed by your provider    Signs of  anemia, such as pale skin, extreme fatigue or weakness, or shortness of breath    Dizziness or fainting   Date Last Reviewed: 11/1/2017 2000-2018 [employer]. [address] [place] [address], [place] [address]. All rights reserved. This information is not intended as a substitute for professional medical care. Always follow your healthcare professional's instructions.

## 2021-07-22 ENCOUNTER — OFFICE VISIT (OUTPATIENT)
Dept: URGENT CARE | Facility: URGENT CARE | Age: 26
End: 2021-07-22

## 2021-07-22 VITALS
SYSTOLIC BLOOD PRESSURE: 104 MMHG | RESPIRATION RATE: 14 BRPM | OXYGEN SATURATION: 100 % | DIASTOLIC BLOOD PRESSURE: 66 MMHG | HEART RATE: 58 BPM | TEMPERATURE: 98.9 F | BODY MASS INDEX: 23.04 KG/M2 | WEIGHT: 128 LBS

## 2021-07-22 DIAGNOSIS — L73.9 FOLLICULITIS: Primary | ICD-10-CM

## 2021-07-22 DIAGNOSIS — R21 RASH AND NONSPECIFIC SKIN ERUPTION: ICD-10-CM

## 2021-07-22 PROCEDURE — 99213 OFFICE O/P EST LOW 20 MIN: CPT | Performed by: PHYSICIAN ASSISTANT

## 2021-07-22 RX ORDER — DOXYCYCLINE HYCLATE 100 MG
100 TABLET ORAL 2 TIMES DAILY
Qty: 14 TABLET | Refills: 0 | Status: SHIPPED | OUTPATIENT
Start: 2021-07-22 | End: 2021-07-29

## 2021-07-22 RX ORDER — DEXAMETHASONE SODIUM PHOSPHATE 4 MG/ML
10 VIAL (ML) INJECTION ONCE
Status: COMPLETED | OUTPATIENT
Start: 2021-07-22 | End: 2021-07-22

## 2021-07-22 RX ADMIN — Medication 10 MG: at 11:40

## 2021-07-22 ASSESSMENT — ENCOUNTER SYMPTOMS
ENDOCRINE NEGATIVE: 1
LIGHT-HEADEDNESS: 0
ARTHRALGIAS: 0
SORE THROAT: 0
MUSCULOSKELETAL NEGATIVE: 1
DIZZINESS: 0
NECK PAIN: 0
RHINORRHEA: 0
EYES NEGATIVE: 1
BACK PAIN: 0
HEADACHES: 0
WOUND: 0
VOMITING: 0
ALLERGIC/IMMUNOLOGIC NEGATIVE: 1
WEAKNESS: 0
SHORTNESS OF BREATH: 0
DIARRHEA: 0
RESPIRATORY NEGATIVE: 1
CARDIOVASCULAR NEGATIVE: 1
MYALGIAS: 0
NAUSEA: 0
JOINT SWELLING: 0
CHILLS: 0
FEVER: 0
NECK STIFFNESS: 0
COUGH: 0
PALPITATIONS: 0

## 2021-07-22 ASSESSMENT — PAIN SCALES - GENERAL: PAINLEVEL: NO PAIN (0)

## 2021-07-22 NOTE — PROGRESS NOTES
Chief Complaint:    Chief Complaint   Patient presents with     Derm Problem     Patient started with the rash about 3 days ago- patient was in [place] last week- rash is on the arms and legs- is spreading- burning- itching.          Medical Decision Making:    Vital signs reviewed by Juan David Diaz PA-C  /66   Pulse 58   Temp 98.9  F (37.2  C) (Tympanic)   Resp 14   Wt 58.1 kg (128 lb)   SpO2 100%   BMI 23.04 kg/m      Differential Diagnosis:  Rash: Atopic dermatitis  Benign, reassuring rash  Contact dermatitis  Folliculitis      ASSESSMENT:     1. Folliculitis    2. Rash and nonspecific skin eruption           PLAN:     Unclear if this is contact dermatitis vs folliculitis.  Rx for Doxycycline today.  Patient given 10 Mg Decadron PO in clinic today.  OTC Benadryl PYN  Patient instructed to follow up with PCP in 1 week if symptoms are not improving.  Sooner if symptoms worsen.  Worrisome symptoms discussed with instructions to go to the ED.  Patient verbalized understanding and agreed with this plan.    Labs:     No results found for any visits on 07/22/21.    Current Meds:    Current Outpatient Medications:      doxycycline hyclate (VIBRA-TABS) 100 MG tablet, Take 1 tablet (100 mg) by mouth 2 times daily for 7 days, Disp: 14 tablet, Rfl: 0     sertraline (ZOLOFT) 50 MG tablet, Take 1 tablet (50 mg) by mouth daily (Patient not taking: Reported on 12/27/2019), Disp: 90 tablet, Rfl: 1  No current facility-administered medications for this visit.    Allergies:  No Known Allergies    SUBJECTIVE    HPI: Naseem Pichardo is an 26 year old female who presents for evaluation and treatment of rash.  Symptoms started 3 days ago and have worsened.  The rash is on the arms and legs and has spread.  The rash is itchy in nature.  She has not tried anything for the rash.  She was in [place] last week. She was in a hot tub right before symptoms started.        ROS:      Review of Systems   Constitutional: Negative for  chills and fever.   HENT: Negative for congestion, ear pain, rhinorrhea and sore throat.    Eyes: Negative.    Respiratory: Negative.  Negative for cough and shortness of breath.    Cardiovascular: Negative.  Negative for chest pain and palpitations.   Gastrointestinal: Negative for diarrhea, nausea and vomiting.   Endocrine: Negative.    Genitourinary: Negative.    Musculoskeletal: Negative.  Negative for arthralgias, back pain, joint swelling, myalgias, neck pain and neck stiffness.   Skin: Positive for rash. Negative for wound.   Allergic/Immunologic: Negative.  Negative for immunocompromised state.   Neurological: Negative for dizziness, weakness, light-headedness and headaches.        Family History   Family History   Problem Relation Age of Onset     No Known Problems Mother      Hypertension Father      Diabetes No family hx of      Coronary Artery Disease No family hx of      Hyperlipidemia No family hx of      Cerebrovascular Disease No family hx of      Breast Cancer No family hx of      Colon Cancer No family hx of      Cancer No family hx of      Thyroid Disease No family hx of      Macular Degeneration No family hx of      Glaucoma No family hx of        Social History  Social History     Socioeconomic History     Marital status: Single     Spouse name: Not on file     Number of children: Not on file     Years of education: Not on file     Highest education level: Not on file   Occupational History     Not on file   Tobacco Use     Smoking status: Former Smoker     Smokeless tobacco: Never Used     Tobacco comment: Juul   Substance and Sexual Activity     Alcohol use: No     Alcohol/week: 0.0 standard drinks     Drug use: No     Sexual activity: Yes     Partners: Male     Birth control/protection: None   Other Topics Concern     Parent/sibling w/ CABG, MI or angioplasty before 65F 55M? Not Asked   Social History Narrative     Not on file     Social Determinants of Health     Financial Resource Strain:       Difficulty of Paying Living Expenses:    Food Insecurity:      Worried About Running Out of Food in the Last Year:      Ran Out of Food in the Last Year:    Transportation Needs:      Lack of Transportation (Medical):      Lack of Transportation (Non-Medical):    Physical Activity:      Days of Exercise per Week:      Minutes of Exercise per Session:    Stress:      Feeling of Stress :    Social Connections:      Frequency of Communication with Friends and Family:      Frequency of Social Gatherings with Friends and Family:      Attends Jewish Services:      Active Member of Clubs or Organizations:      Attends Club or Organization Meetings:      Marital Status:    Intimate Partner Violence:      Fear of Current or Ex-Partner:      Emotionally Abused:      Physically Abused:      Sexually Abused:         Surgical History:  Past Surgical History:   Procedure Laterality Date     NO HISTORY OF SURGERY          Problem List:  There is no problem list on file for this patient.          OBJECTIVE:     Vital signs noted and reviewed by Juan David Diaz PA-C  /66   Pulse 58   Temp 98.9  F (37.2  C) (Tympanic)   Resp 14   Wt 58.1 kg (128 lb)   SpO2 100%   BMI 23.04 kg/m       PEFR:    Physical Exam  Vitals and nursing note reviewed.   Constitutional:       General: She is not in acute distress.     Appearance: She is well-developed. She is not ill-appearing, toxic-appearing or diaphoretic.   HENT:      Head: Normocephalic and atraumatic.      Right Ear: Tympanic membrane and external ear normal. No drainage, swelling or tenderness. Tympanic membrane is not perforated, erythematous, retracted or bulging.      Left Ear: Tympanic membrane and external ear normal. No drainage, swelling or tenderness. Tympanic membrane is not perforated, erythematous, retracted or bulging.      Nose: No mucosal edema, congestion or rhinorrhea.      Right Sinus: No maxillary sinus tenderness or frontal sinus tenderness.       Left Sinus: No maxillary sinus tenderness or frontal sinus tenderness.      Mouth/Throat:      Pharynx: No pharyngeal swelling, oropharyngeal exudate, posterior oropharyngeal erythema or uvula swelling.      Tonsils: No tonsillar abscesses.   Eyes:      Pupils: Pupils are equal, round, and reactive to light.   Neck:      Trachea: Trachea normal.   Cardiovascular:      Rate and Rhythm: Normal rate and regular rhythm.      Heart sounds: Normal heart sounds, S1 normal and S2 normal. No murmur heard.   No friction rub. No gallop.    Pulmonary:      Effort: Pulmonary effort is normal. No respiratory distress.      Breath sounds: Normal breath sounds. No decreased breath sounds, wheezing, rhonchi or rales.   Abdominal:      General: Bowel sounds are normal. There is no distension.      Palpations: Abdomen is soft. Abdomen is not rigid. There is no mass.      Tenderness: There is no abdominal tenderness. There is no guarding or rebound.   Musculoskeletal:      Cervical back: Full passive range of motion without pain, normal range of motion and neck supple.   Lymphadenopathy:      Cervical: No cervical adenopathy.   Skin:     General: Skin is warm and dry.      Findings: Erythema and rash present. No ecchymosis. Rash is papular and urticarial. Rash is not crusting, macular, purpuric, pustular, scaling or vesicular.      Comments: Erythematous papular rash covering bilateral arms and legs.   Neurological:      Mental Status: She is alert and oriented to person, place, and time.      Cranial Nerves: No cranial nerve deficit.      Deep Tendon Reflexes: Reflexes are normal and symmetric.   Psychiatric:         Behavior: Behavior normal. Behavior is cooperative.         Thought Content: Thought content normal.         Judgment: Judgment normal.             Juan David Diaz PA-C  7/22/2021, 11:00 AM

## 2021-07-22 NOTE — PATIENT INSTRUCTIONS
Patient Education     Understanding Folliculitis  Folliculitis is when hair follicles become inflamed. Follicles are the tiny holes from which hair grows out of your skin. This skin condition can occur any place on the body where hair grows. But it s often found on the neck, face, and scalp.    How to say it  [id number]  What causes folliculitis?  An infection or irritation can cause this skin condition. Infectious folliculitis is usually caused by Staph aureus. But it may also be caused by other bacteria or fungi. The condition can also happen from a wound or irritation of the skin. Shaving is a common cause. Some cases may come from taking certain medicines, such as those that treat acne.   Symptoms of folliculitis  This skin condition tends to develop quickly. It looks like little pimples on a base of a red, inflamed hair follicles. These bumps may ooze pus. They may also be:     Itchy    Painful    Red    Swollen  Treatment for folliculitis  Treatment depends on the cause of the inflammation. In some cases, this skin condition will go away on its own in a few days. But it may return. Treatment options include:     Warm compress. Putting a warm, wet washcloth on the inflamed skin may help. Don't reuse the same washcloth, because that may spread infection.    Medicine. Many skin (topical) and oral medicines are available. Antibiotics are used for bacterial infections. Antifungal medicines are best for fungal infections.    Good hygiene. Keeping the skin clean can help. Use a clean razor when shaving. Prevent ingrown hairs after shaving. This can reduce folliculitis in the beard area. Avoid any substances that bother your skin.  When to call your healthcare provider  Call your healthcare provider right away if you have any of these:    Fever of 100.4 F (38 C) or higher, or as directed by your healthcare provider    Pain that gets worse    Symptoms that don t get better, or get worse    New  The types of intraocular lenses were reviewed with the patient along with a discussion of their various strengths and weaknesses. symptoms  [employer] last reviewed this educational content on 6/1/2019 2000-2021 [employer]. All rights reserved. This information is not intended as a substitute for professional medical care. Always follow your healthcare professional's instructions.
